# Patient Record
Sex: MALE | Race: WHITE | Employment: FULL TIME | URBAN - METROPOLITAN AREA
[De-identification: names, ages, dates, MRNs, and addresses within clinical notes are randomized per-mention and may not be internally consistent; named-entity substitution may affect disease eponyms.]

---

## 2018-07-11 ENCOUNTER — THERAPY VISIT (OUTPATIENT)
Dept: PHYSICAL THERAPY | Facility: CLINIC | Age: 48
End: 2018-07-11

## 2018-07-11 DIAGNOSIS — M54.5 LOW BACK PAIN, UNSPECIFIED BACK PAIN LATERALITY, UNSPECIFIED CHRONICITY, WITH SCIATICA PRESENCE UNSPECIFIED: Primary | ICD-10-CM

## 2018-07-11 PROCEDURE — 97530 THERAPEUTIC ACTIVITIES: CPT | Mod: GP | Performed by: PHYSICAL THERAPIST

## 2018-07-11 PROCEDURE — 97161 PT EVAL LOW COMPLEX 20 MIN: CPT | Mod: GP | Performed by: PHYSICAL THERAPIST

## 2018-07-11 PROCEDURE — 97110 THERAPEUTIC EXERCISES: CPT | Mod: GP | Performed by: PHYSICAL THERAPIST

## 2018-07-11 NOTE — MR AVS SNAPSHOT
After Visit Summary   7/11/2018    Leopoldo Scott    MRN: [id number]           Patient Information     Date Of Birth          1970        Visit Information        Provider Department      7/11/2018 3:00 PM Ledy Sands PT [facility]        Today's Diagnoses     Low back pain, unspecified back pain laterality, unspecified chronicity, with sciatica presence unspecified    -  1       Follow-ups after your visit        Who to contact     If you have questions or need follow up information about today's clinic visit or your schedule please contact [facility] directly at [phone].  Normal or non-critical lab and imaging results will be communicated to you by MyChart, letter or phone within 4 business days after the clinic has received the results. If you do not hear from us within 7 days, please contact the clinic through MyChart or phone. If you have a critical or abnormal lab result, we will notify you by phone as soon as possible.  Submit refill requests through [employer] or call your pharmacy and they will forward the refill request to us. Please allow 3 business days for your refill to be completed.          Additional Information About Your Visit        Care EveryWhere ID     This is your Care EveryWhere ID. This could be used by other organizations to access your [place] medical records  [id number]         Blood Pressure from Last 3 Encounters:   08/09/16 126/64   03/26/14 130/70   08/14/13 124/80    Weight from Last 3 Encounters:   08/09/16 92.5 kg (204 lb)   03/26/14 93.9 kg (207 lb)   08/14/13 91.4 kg (201 lb 6.4 oz)              We Performed the Following     HC PT EVAL, LOW COMPLEXITY     RHONA INITIAL EVAL REPORT     THERAPEUTIC ACTIVITIES     THERAPEUTIC EXERCISES        Primary Care Provider Office Phone # Fax #    Binu Starks [phone]  [facility] [address]         Equal Access to Services     [facility] : Hadii aad ku hadalejandrokumar Brindamathew, waflorda lujosselinemirandaha, qasilvio faustonicolakareen bruce. So [facility] [phone].    ATENCIÓN: Si habla español, tiene a melara disposición servicios gratuitos de asistencia lingüística. Llame al [phone].    We comply with applicable federal civil rights laws and [place] laws. We do not discriminate on the basis of race, color, national origin, age, disability, sex, sexual orientation, or gender identity.            Thank you!     Thank you for choosing [facility]   for your care. Our goal is always to provide you with excellent care. Hearing back from our patients is one way we can continue to improve our services. Please take a few minutes to complete the written survey that you may receive in the mail after your visit with us. Thank you!             Your Updated Medication List - Protect others around you: Learn how to safely use, store and throw away your medicines at www.disposemymeds.org.          This list is accurate as of 7/11/18 11:59 PM.  Always use your most recent med list.                   Brand Name Dispense Instructions for use Diagnosis    FISH OIL PO      Patient taking 2400-3600mg of this every day.        triamcinolone 0.1 % paste    KENALOG    5 g    Take by mouth 2 times daily    Oral lichen planus

## 2018-07-11 NOTE — PROGRESS NOTES
Subjective:    Referral source (MD, PT, DC) Self-referral  Dx LBP  DOI: 2017  Type of bike: hybrid [place]ale  How long have you been riding this bike: 3 months  Have you had a previous bikefit: no   Any recent changes to your bike added new stem to lift handle bars, different seats  Type of pedals: (clipless, flats)  Weekly miles bikin  Do you ride year round:(yes, no) no  How would you describe the type of rider you are (commuter, weekend warrior, recreational rider, racer)  Training for [employer]    Symptomology:   Do you currently have pain (yes, no)   How long have you had the pain years  Frequency of pain after 2-3 hrs of biking  Description of pain aching at middle low back  Where is the pain low back  Aggravating factors (other activities outside of riding that causes pain) and pain scale rating: prolonged standing, 2-310  How do you provoke the pain with riding(hills, mileage, intensity, gearing,)? mileage   What value is it on the pain scale?  2-310  How would you rate the intensity of your riding(RPE) with the pain?  12  RPE   (6 No exertion at all  7    7.5 Extremely light (7.5)  8    9  Very light  10    11 Light  12    13 Somewhat hard  14    15 Hard (heavy)  16    17 Very hard  18    19 Extremely hard  20 Maximal exertion)    Sx management strategies and lowest pain rating: sitting up taller and extending low back: 0/10      Objective:    Off the Bike measures, as indicated    Flexibility Screen:    Right Left             Hamstring WNL WNL   Hip Flexor     ITB/Lat Hip in SL     Quadriceps WNL Slight decrease   Gastroc/ Soleus WNL WNL   - = normal  + = mild tightness  ++ = moderate tightness  +++ = significant tightness       Static bike measures measurements:    Seat Level: measure off  if possible Initial: neutral Post: no change               Knee left right   Knee flex angle (seat height) 25 degrees/mm seat moved seat forward and new knee angle 30 degrees 25 degrees/mm seat moved  seate forward and new knee angle 30     Seat Position  Seat Fore/aft posterior moved seat forward 1 cm       Trunk angle    Shoulder angle (humerus, T1-7 with axis at GH, goal around 90) 90   Shoulder width Slightly excessive width      left right   Elbow (flexion angle) Slight bend Slight bend   Wrist position: neutral neutral     No Cleats     Some toe pedalling - educated pt in trying to keep foot netural                   Cleat Wedges - no cleats Right  left            Dynamic Assessment:    Anterior view:  Knee position/pedal stroke: left right    Top neutral;  Bottom neutral Top neutral bottom: neutral     Lateral view:  Trunk Position Good position   Reach Good position   Knee position Good position     Posterior view:  Pelvic Rotation neutral   UE Reach neutral       Clinical assessment and changes:    Increased torso angle from 50 degrees to 60 degrees by moving his seat 1 cm aft to decrease strain on low back and increase power of pedal stroke.  Educated pt on trying to decrease PF position with pedaling to decrease strain on calf muscles.            [facility] Initial Evaluation  Subjective:  HPI  Oswestry Score: 6.67 %                 Objective:  System    Physical Exam    General     ROS    Assessment/Plan:    Patient is a 47 year old male with lumbar complaints.    Patient has the following significant findings with corresponding treatment plan.                Diagnosis 1:  LBP  Pain -  directional preference exercise and home program  Decreased flexibility - therapeutic exercise and home program  Impaired muscle performance - neuro re-education and home program  Decreased function - therapeutic activities and home program    Therapy Evaluation Codes:   1) History comprised of:   Personal factors that impact the plan of care:      None.    Comorbidity factors that impact the plan of care are:      None.     Medications impacting care: None.  2) Examination of Body Systems comprised  of:   Body structures and functions that impact the plan of care:      Lumbar spine.   Activity limitations that impact the plan of care are:      Standing and biking.  3) Clinical presentation characteristics are:   Stable/Uncomplicated.  4) Decision-Making    Low complexity using standardized patient assessment instrument and/or measureable assessment of functional outcome.  Cumulative Therapy Evaluation is: Low complexity.    Previous and current functional limitations:  (See Goal Flow Sheet for this information)    Short term and Long term goals: (See Goal Flow Sheet for this information)     Communication ability:  Patient appears to be able to clearly communicate and understand verbal and written communication and follow directions correctly.  Treatment Explanation - The following has been discussed with the patient:   RX ordered/plan of care  Anticipated outcomes  Possible risks and side effects  This patient would benefit from PT intervention to resume normal activities.   Rehab potential is excellent.    Frequency:  1 X week, once daily  Duration:  for 1-2 weeks  Discharge Plan:  Achieve all LTG.  Independent in home treatment program.  Reach maximal therapeutic benefit.    Please refer to the daily flowsheet for treatment today, total treatment time and time spent performing 1:1 timed codes.

## 2018-07-11 NOTE — PROGRESS NOTES
[facility] Initial Evaluation  Subjective:  HPI Comments: Pt reports that he had a microdiscectomy in 2003.    Patient is a 47 year old male presenting with rehab back hpi. The history is provided by the patient. No  was used.   Leopoldo Scott is a 47 year old male with a lumbar condition.  Condition occurred with:  Other reason.  Condition occurred: other.  This is a chronic condition  5 years ago.    Patient reports pain:  Lumbar spine left and lumbar spine right.    Pain is described as aching and is intermittent      Symptoms are exacerbated by standing (1 hr tolerance standing, 2-3 hrs biking before back pain) and relieved by rest.  Since onset symptoms are gradually worsening (due to training for [employer]).        General health as reported by patient is good.    Medical allergies: yes.  Other surgeries include:  Orthopedic surgery (lumbar microdiscectomy).  Current medications:  Anti-inflammatory.  Current occupation is [employer].    Primary job tasks include:  Prolonged sitting.    Barriers include:  None as reported by the patient.    Red flags:  None as reported by the patient.                        Objective:  System         Lumbar/SI Evaluation  ROM:    AROM Lumbar:   Flexion:          WNL  Ext:                    WNL   Side Bend:        Left:     Right:   Rotation:           Left:     Right:   Side Glide:        Left:  WNL    Right:  WNL                                                              Hip Evaluation    Hip Strength:    Flexion:   Left: 5/5   Pain:  Right: 5/5   Pain:                    Extension:  Left: 4+/5  Pain:Right: 4+/5    Pain:    Abduction:  Left: 5/5     Pain:Right: 5/5    Pain:    Internal Rotation:  Left: 5/5    Pain:Right: 5/5   Pain:  External Rotation:  Left: 5/5   Pain:  Right: 5/5   Pain:  Knee Flexion:  Left: 5/5   Pain:Right: 5/5   Pain:  Knee Extension:  Left: 5/5   Pain:Right: 5/5    Pain:            Functional Testing:           Quad:    Single leg squat:    Left:    2  Significant loss of control, hip substitution, femoral IR, excessive anterior knee excursion and decreased hip/trunk flexion  Right:  2  Significant loss of control, hip substitution, femoral IR, decreased hip/trunk flexion and excessive anterior knee excursion    Bilateral leg squat:  2  Moderate loss of control, fermoral IR, excessive anterior knee excursion and decreased hip/trunk flexion                  General     ROS

## 2018-07-12 PROBLEM — M54.5 LOW BACK PAIN, UNSPECIFIED BACK PAIN LATERALITY, UNSPECIFIED CHRONICITY, WITH SCIATICA PRESENCE UNSPECIFIED: Status: ACTIVE | Noted: 2018-07-12

## 2018-10-17 PROBLEM — M54.5 LOW BACK PAIN, UNSPECIFIED BACK PAIN LATERALITY, UNSPECIFIED CHRONICITY, WITH SCIATICA PRESENCE UNSPECIFIED: Status: RESOLVED | Noted: 2018-07-12 | Resolved: 2018-10-17

## 2019-10-01 PROBLEM — M54.50 LOW BACK PAIN: Status: RESOLVED | Noted: 2018-07-12 | Resolved: 2018-10-17

## 2021-04-09 ENCOUNTER — DOCUMENTATION ONLY (OUTPATIENT)
Dept: LAB | Facility: CLINIC | Age: 51
End: 2021-04-09

## 2021-04-09 DIAGNOSIS — L43.8 ORAL LICHEN PLANUS: ICD-10-CM

## 2021-04-09 DIAGNOSIS — E78.2 MIXED HYPERLIPIDEMIA: Primary | ICD-10-CM

## 2021-04-09 DIAGNOSIS — R73.01 IFG (IMPAIRED FASTING GLUCOSE): ICD-10-CM

## 2021-04-09 DIAGNOSIS — Z12.5 SCREENING FOR PROSTATE CANCER: ICD-10-CM

## 2021-04-14 DIAGNOSIS — L43.8 ORAL LICHEN PLANUS: ICD-10-CM

## 2021-04-14 DIAGNOSIS — R73.01 IFG (IMPAIRED FASTING GLUCOSE): ICD-10-CM

## 2021-04-14 DIAGNOSIS — Z12.5 SCREENING FOR PROSTATE CANCER: ICD-10-CM

## 2021-04-14 DIAGNOSIS — E78.2 MIXED HYPERLIPIDEMIA: ICD-10-CM

## 2021-04-14 LAB
ALBUMIN SERPL-MCNC: 4.2 G/DL (ref 3.4–5)
ALP SERPL-CCNC: 69 U/L (ref 40–150)
ALT SERPL W P-5'-P-CCNC: 29 U/L (ref 0–70)
ANION GAP SERPL CALCULATED.3IONS-SCNC: 1 MMOL/L (ref 3–14)
AST SERPL W P-5'-P-CCNC: 18 U/L (ref 0–45)
BASOPHILS # BLD AUTO: 0 10E9/L (ref 0–0.2)
BASOPHILS NFR BLD AUTO: 0.4 %
BILIRUB SERPL-MCNC: 0.8 MG/DL (ref 0.2–1.3)
BUN SERPL-MCNC: 15 MG/DL (ref 7–30)
CALCIUM SERPL-MCNC: 9.3 MG/DL (ref 8.5–10.1)
CHLORIDE SERPL-SCNC: 107 MMOL/L (ref 94–109)
CHOLEST SERPL-MCNC: 220 MG/DL
CO2 SERPL-SCNC: 29 MMOL/L (ref 20–32)
CREAT SERPL-MCNC: 1 MG/DL (ref 0.66–1.25)
DIFFERENTIAL METHOD BLD: NORMAL
EOSINOPHIL # BLD AUTO: 0.3 10E9/L (ref 0–0.7)
EOSINOPHIL NFR BLD AUTO: 4.3 %
ERYTHROCYTE [DISTWIDTH] IN BLOOD BY AUTOMATED COUNT: 13.5 % (ref 10–15)
GFR SERPL CREATININE-BSD FRML MDRD: 88 ML/MIN/{1.73_M2}
GLUCOSE SERPL-MCNC: 95 MG/DL (ref 70–99)
HBA1C MFR BLD: 5.3 % (ref 0–5.6)
HCT VFR BLD AUTO: 49.5 % (ref 40–53)
HDLC SERPL-MCNC: 38 MG/DL
HGB BLD-MCNC: 17.1 G/DL (ref 13.3–17.7)
LDLC SERPL CALC-MCNC: ABNORMAL MG/DL
LYMPHOCYTES # BLD AUTO: 1.9 10E9/L (ref 0.8–5.3)
LYMPHOCYTES NFR BLD AUTO: 28.3 %
MCH RBC QN AUTO: 30.3 PG (ref 26.5–33)
MCHC RBC AUTO-ENTMCNC: 34.5 G/DL (ref 31.5–36.5)
MCV RBC AUTO: 88 FL (ref 78–100)
MONOCYTES # BLD AUTO: 0.5 10E9/L (ref 0–1.3)
MONOCYTES NFR BLD AUTO: 7.4 %
NEUTROPHILS # BLD AUTO: 4.1 10E9/L (ref 1.6–8.3)
NEUTROPHILS NFR BLD AUTO: 59.6 %
NONHDLC SERPL-MCNC: 182 MG/DL
PLATELET # BLD AUTO: 221 10E9/L (ref 150–450)
POTASSIUM SERPL-SCNC: 4.4 MMOL/L (ref 3.4–5.3)
PROT SERPL-MCNC: 7.2 G/DL (ref 6.8–8.8)
PSA SERPL-ACNC: 0.77 UG/L (ref 0–4)
RBC # BLD AUTO: 5.65 10E12/L (ref 4.4–5.9)
SODIUM SERPL-SCNC: 137 MMOL/L (ref 133–144)
TRIGL SERPL-MCNC: 456 MG/DL
WBC # BLD AUTO: 6.8 10E9/L (ref 4–11)

## 2021-04-14 PROCEDURE — 85025 COMPLETE CBC W/AUTO DIFF WBC: CPT | Performed by: INTERNAL MEDICINE

## 2021-04-14 PROCEDURE — 80061 LIPID PANEL: CPT | Performed by: INTERNAL MEDICINE

## 2021-04-14 PROCEDURE — 83036 HEMOGLOBIN GLYCOSYLATED A1C: CPT | Performed by: INTERNAL MEDICINE

## 2021-04-14 PROCEDURE — 80053 COMPREHEN METABOLIC PANEL: CPT | Performed by: INTERNAL MEDICINE

## 2021-04-14 PROCEDURE — G0103 PSA SCREENING: HCPCS | Performed by: INTERNAL MEDICINE

## 2021-04-14 PROCEDURE — 36415 COLL VENOUS BLD VENIPUNCTURE: CPT | Performed by: INTERNAL MEDICINE

## 2021-04-19 ENCOUNTER — OFFICE VISIT (OUTPATIENT)
Dept: INTERNAL MEDICINE | Facility: CLINIC | Age: 51
End: 2021-04-19

## 2021-04-19 VITALS
SYSTOLIC BLOOD PRESSURE: 122 MMHG | WEIGHT: 207 LBS | DIASTOLIC BLOOD PRESSURE: 80 MMHG | BODY MASS INDEX: 29.7 KG/M2 | TEMPERATURE: 98.1 F | OXYGEN SATURATION: 97 % | HEART RATE: 64 BPM

## 2021-04-19 DIAGNOSIS — Z12.11 SPECIAL SCREENING FOR MALIGNANT NEOPLASMS, COLON: ICD-10-CM

## 2021-04-19 DIAGNOSIS — Z00.00 ROUTINE HISTORY AND PHYSICAL EXAMINATION OF ADULT: Primary | ICD-10-CM

## 2021-04-19 DIAGNOSIS — E78.2 MIXED HYPERLIPIDEMIA: ICD-10-CM

## 2021-04-19 PROCEDURE — 99396 PREV VISIT EST AGE 40-64: CPT | Performed by: INTERNAL MEDICINE

## 2021-04-19 NOTE — PROGRESS NOTES
SUBJECTIVE:   CC: Leopoldo Scott is an 50 year old male who presents for preventative health visit.       Patient has been advised of split billing requirements and indicates understanding: Yes  Healthy Habits:    Getting at least 3 servings of Calcium per day:  Yes    Bi-annual eye exam:  NO    Dental care twice a year:  NO    Sleep apnea or symptoms of sleep apnea:  None    Diet:  Regular (no restrictions)    Frequency of exercise:  4-5 days/week    Duration of exercise:  Other    Taking medications regularly:  Yes    Barriers to taking medications:  Problems remembering to take them    Medication side effects:  None    PHQ-2 Total Score:    Additional concerns today:  No        Walking, yard work, biking.                  Not taking much fish oil.         He has mixed hyperlipidemia, with high triglycerides and low HDL.         He has not wanted prescription medications to treat this abnormality.        He has not been taking OTC fish oil with any regularity.       Wants to try that approach again.                                 He has 6 healthy children.             He is employed.                             He does not want Covid vaccine.        He states he had a Td vaccine less than 10 years ago.            He is willing to have a colonoscopy.      Today's PHQ-2 Score:   PHQ-2 ( 1999 Pfizer) 8/9/2016   Q1: Little interest or pleasure in doing things 0   Q2: Feeling down, depressed or hopeless 0   PHQ-2 Score 0       Abuse: Current or Past(Physical, Sexual or Emotional)- No  Do you feel safe in your environment? Yes    Have you ever done Advance Care Planning? (For example, a Health Directive, POLST, or a discussion with a medical provider or your loved ones about your wishes): Yes, patient states has an Advance Care Planning document and will bring a copy to the clinic.    Social History     Tobacco Use     Smoking status: Never Smoker     Smokeless tobacco: Never Used   Substance Use Topics     Alcohol  use: No     If you drink alcohol do you typically have >3 drinks per day or >7 drinks per week? No    Alcohol Use 8/9/2016   Prescreen: >3 drinks/day or >7 drinks/week? The patient does not drink >3 drinks per day nor >7 drinks per week.       Last PSA:   PSA   Date Value Ref Range Status   04/14/2021 0.77 0 - 4 ug/L Final     Comment:     Assay Method:  Chemiluminescence using Siemens Vista analyzer       Reviewed orders with patient. Reviewed health maintenance and updated orders accordingly - Yes  Patient Active Problem List    Diagnosis Date Noted     Mixed hyperlipidemia 08/14/2013     Priority: High     High TG ( > 700 without Rx ) and low HDL( < 40).              Did not tolerate pravastatin or > 500 mg niacin or > 1 gm fish oil; he did not accept a gemfibrozil rx in 2007.           In 3/14, taking 500 mg niacin, 1200 mg red yeast rice, 1400 mg fish oil;   202/39/106/287; increase  Red yeast rice to 1800 mg       Plantar fasciitis 08/09/2016     Priority: Medium     Oral lichen planus 08/09/2016     Priority: Medium     IFG (impaired fasting glucose) 10/28/2014     Priority: Medium     100 in 10/14       Preventive measure 08/12/2013     Priority: Low     APRIMA DATA BASE UNDER THE 8/12/13 NOTE         Past Surgical History:   Procedure Laterality Date     SPINE SURGERY  2003    lumbar discectomy     Social History     Socioeconomic History     Marital status:      Spouse name: Not on file     Number of children: 6     Years of education: Not on file     Highest education level: Not on file   Occupational History     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[place]     Employer: [employer]     Comment: [employer]   Social Needs     Financial resource strain: Not on file     Food insecurity     Worry: Not on file     Inability: Not on file     Transportation needs     Medical: Not on file     Non-medical: Not on file   Tobacco Use     Smoking status: Never Smoker     Smokeless tobacco: Never Used   Substance and  Sexual Activity     Alcohol use: No     Drug use: No     Sexual activity: Yes     Partners: Female   Lifestyle     Physical activity     Days per week: Not on file     Minutes per session: Not on file     Stress: Not on file   Relationships     Social connections     Talks on phone: Not on file     Gets together: Not on file     Attends [employer]ars[employer] service: Not on file     Active member of club or organization: Not on file     Attends meetings of clubs or organizations: Not on file     Relationship status: Not on file     Intimate partner violence     Fear of current or ex partner: Not on file     Emotionally abused: Not on file     Physically abused: Not on file     Forced sexual activity: Not on file   Other Topics Concern     Parent/sibling w/ CABG, MI or angioplasty before 65F 55M? No   Social History Narrative     Not on file     Immunization History   Administered Date(s) Administered     Tdap (Adacel,Boostrix) 09/28/2007     Family History   Problem Relation Age of Onset     Diabetes No family hx of      Cancer No family hx of        BP Readings from Last 3 Encounters:   04/19/21 122/80   08/09/16 126/64   03/26/14 130/70    Wt Readings from Last 3 Encounters:   04/19/21 93.9 kg (207 lb)   08/09/16 92.5 kg (204 lb)   03/26/14 93.9 kg (207 lb)                  Current Outpatient Medications   Medication Sig Dispense Refill     Omega-3 Fatty Acids (FISH OIL PO) Patient taking 2400-3600mg of this every day.       triamcinolone (KENALOG) 0.1 % paste Take by mouth 2 times daily 5 g 11     Allergies   Allergen Reactions     Doxycycline      Pravastatin        Reviewed and updated as needed this visit by clinical staff                 Reviewed and updated as needed this visit by Provider                    Review of Systems      OBJECTIVE:   /80   Pulse 64   Temp 98.1  F (36.7  C) (Oral)   Wt 93.9 kg (207 lb)   SpO2 97%   BMI 29.70 kg/m      Physical Exam  GENERAL APPEARANCE: alert, no distress and over  weight  NECK: no adenopathy, no asymmetry, masses, or scars and thyroid normal to palpation  RESP: lungs clear to auscultation - no rales, rhonchi or wheezes  CV: regular rates and rhythm, normal S1 S2, no S3 or S4 and no murmur, click or rub    Diagnostic Test Results:  Recent Results (from the past 400 hour(s))   Hemoglobin A1c    Collection Time: 04/14/21  8:45 AM   Result Value Ref Range    Hemoglobin A1C 5.3 0 - 5.6 %   Comprehensive metabolic panel (BMP + Alb, Alk Phos, ALT, AST, Total. Bili, TP)    Collection Time: 04/14/21  8:45 AM   Result Value Ref Range    Sodium 137 133 - 144 mmol/L    Potassium 4.4 3.4 - 5.3 mmol/L    Chloride 107 94 - 109 mmol/L    Carbon Dioxide 29 20 - 32 mmol/L    Anion Gap 1 (L) 3 - 14 mmol/L    Glucose 95 70 - 99 mg/dL    Urea Nitrogen 15 7 - 30 mg/dL    Creatinine 1.00 0.66 - 1.25 mg/dL    GFR Estimate 88 >60 mL/min/[1.73_m2]    GFR Estimate If Black >90 >60 mL/min/[1.73_m2]    Calcium 9.3 8.5 - 10.1 mg/dL    Bilirubin Total 0.8 0.2 - 1.3 mg/dL    Albumin 4.2 3.4 - 5.0 g/dL    Protein Total 7.2 6.8 - 8.8 g/dL    Alkaline Phosphatase 69 40 - 150 U/L    ALT 29 0 - 70 U/L    AST 18 0 - 45 U/L   Lipid Profile (Chol, Trig, HDL, LDL calc)    Collection Time: 04/14/21  8:45 AM   Result Value Ref Range    Cholesterol 220 (H) <200 mg/dL    Triglycerides 456 (H) <150 mg/dL    HDL Cholesterol 38 (L) >39 mg/dL    LDL Cholesterol Calculated  <100 mg/dL     Cannot estimate LDL when triglyceride exceeds 400 mg/dL    Non HDL Cholesterol 182 (H) <130 mg/dL   CBC with platelets and differential    Collection Time: 04/14/21  8:45 AM   Result Value Ref Range    WBC 6.8 4.0 - 11.0 10e9/L    RBC Count 5.65 4.4 - 5.9 10e12/L    Hemoglobin 17.1 13.3 - 17.7 g/dL    Hematocrit 49.5 40.0 - 53.0 %    MCV 88 78 - 100 fl    MCH 30.3 26.5 - 33.0 pg    MCHC 34.5 31.5 - 36.5 g/dL    RDW 13.5 10.0 - 15.0 %    Platelet Count 221 150 - 450 10e9/L    % Neutrophils 59.6 %    % Lymphocytes 28.3 %    % Monocytes 7.4 %     % Eosinophils 4.3 %    % Basophils 0.4 %    Absolute Neutrophil 4.1 1.6 - 8.3 10e9/L    Absolute Lymphocytes 1.9 0.8 - 5.3 10e9/L    Absolute Monocytes 0.5 0.0 - 1.3 10e9/L    Absolute Eosinophils 0.3 0.0 - 0.7 10e9/L    Absolute Basophils 0.0 0.0 - 0.2 10e9/L    Diff Method Automated Method    PSA, screen    Collection Time: 04/14/21  8:45 AM   Result Value Ref Range    PSA 0.77 0 - 4 ug/L         ASSESSMENT/PLAN:   Leopoldo was seen today for physical.    Diagnoses and all orders for this visit:    Routine history and physical examination of adult    Mixed hyperlipidemia  -     Lipid Profile (Chol, Trig, HDL, LDL calc); Future    Special screening for malignant neoplasms, colon  -     GASTROENTEROLOGY ADULT REF PROCEDURE ONLY; Future     Summary and implications:  We reviewed multiple issues.           We reviewed all of the issues on the diagnoses list.           Patient Instructions                Go ahead and resume taking the omega-3 fish oil; 1200 mg with each meal.       Let's plan on rechecking the lipids in 6 months.         If you do not hear from the GI  regarding a colonoscopy, call [phone].              Please note that I am planning to retire on December 31, 2021.              You are welcome to continue your care through this clinic.              For a variety of reasons I will not refer you to a specific provider.                  Another option of course is to switch to a clinic which is closer to your residence.         Return in about 6 months (around 10/19/2021) for follow up of several issues, fasting labs 1 wk before your next appointment.      Patient has been advised of split billing requirements and indicates understanding: Yes  COUNSELING:   Reviewed preventive health counseling, as reflected in patient instructions  Special attention given to:        Regular exercise       Healthy diet/nutrition    Estimated body mass index is 29.27 kg/m  as calculated from the following:     "Height as of 8/9/16: 1.778 m (5' 10\").    Weight as of 8/9/16: 92.5 kg (204 lb).     Weight management plan: Discussed healthy diet and exercise guidelines    He reports that he has never smoked. He has never used smokeless tobacco.      Counseling Resources:  ATP IV Guidelines  Pooled Cohorts Equation Calculator  FRAX Risk Assessment  ICSI Preventive Guidelines  Dietary Guidelines for Americans, 2010  USDA's MyPlate  ASA Prophylaxis  Lung CA Screening    Binu Starks MD  [facility]  "

## 2021-04-19 NOTE — PATIENT INSTRUCTIONS
Go ahead and resume taking the omega-3 fish oil; 1200 mg with each meal.       Let's plan on rechecking the lipids in 6 months.         If you do not hear from the GI  regarding a colonoscopy, call [phone].              Please note that I am planning to retire on December 31, 2021.              You are welcome to continue your care through this clinic.              For a variety of reasons I will not refer you to a specific provider.                  Another option of course is to switch to a clinic which is closer to your residence.

## 2023-11-17 ENCOUNTER — LAB REQUISITION (OUTPATIENT)
Dept: LAB | Facility: CLINIC | Age: 53
End: 2023-11-17

## 2023-11-17 DIAGNOSIS — Z12.5 ENCOUNTER FOR SCREENING FOR MALIGNANT NEOPLASM OF PROSTATE: ICD-10-CM

## 2023-11-17 LAB — PSA SERPL DL<=0.01 NG/ML-MCNC: 1.26 NG/ML (ref 0–3.5)

## 2023-11-17 PROCEDURE — 84153 ASSAY OF PSA TOTAL: CPT | Performed by: STUDENT IN AN ORGANIZED HEALTH CARE EDUCATION/TRAINING PROGRAM

## 2024-02-19 ENCOUNTER — LAB REQUISITION (OUTPATIENT)
Dept: LAB | Facility: CLINIC | Age: 54
End: 2024-02-19

## 2024-02-19 DIAGNOSIS — R73.03 PREDIABETES: ICD-10-CM

## 2024-02-19 DIAGNOSIS — E78.1 PURE HYPERGLYCERIDEMIA: ICD-10-CM

## 2024-02-19 LAB
ALBUMIN SERPL BCG-MCNC: 4.8 G/DL (ref 3.5–5.2)
ALP SERPL-CCNC: 65 U/L (ref 40–150)
ALT SERPL W P-5'-P-CCNC: 23 U/L (ref 0–70)
ANION GAP SERPL CALCULATED.3IONS-SCNC: 9 MMOL/L (ref 7–15)
AST SERPL W P-5'-P-CCNC: 19 U/L (ref 0–45)
BILIRUB SERPL-MCNC: 0.9 MG/DL
BUN SERPL-MCNC: 15.6 MG/DL (ref 6–20)
CALCIUM SERPL-MCNC: 9.9 MG/DL (ref 8.6–10)
CHLORIDE SERPL-SCNC: 101 MMOL/L (ref 98–107)
CREAT SERPL-MCNC: 0.97 MG/DL (ref 0.67–1.17)
DEPRECATED HCO3 PLAS-SCNC: 25 MMOL/L (ref 22–29)
EGFRCR SERPLBLD CKD-EPI 2021: >90 ML/MIN/1.73M2
GLUCOSE SERPL-MCNC: 99 MG/DL (ref 70–99)
HBA1C MFR BLD: 5.3 %
POTASSIUM SERPL-SCNC: 4.2 MMOL/L (ref 3.4–5.3)
PROT SERPL-MCNC: 7.2 G/DL (ref 6.4–8.3)
SODIUM SERPL-SCNC: 135 MMOL/L (ref 135–145)

## 2024-02-19 PROCEDURE — 82040 ASSAY OF SERUM ALBUMIN: CPT | Performed by: STUDENT IN AN ORGANIZED HEALTH CARE EDUCATION/TRAINING PROGRAM

## 2024-02-19 PROCEDURE — 82465 ASSAY BLD/SERUM CHOLESTEROL: CPT | Performed by: STUDENT IN AN ORGANIZED HEALTH CARE EDUCATION/TRAINING PROGRAM

## 2024-02-19 PROCEDURE — 83036 HEMOGLOBIN GLYCOSYLATED A1C: CPT | Performed by: STUDENT IN AN ORGANIZED HEALTH CARE EDUCATION/TRAINING PROGRAM

## 2024-02-20 LAB
CHOLEST SERPL-MCNC: 194 MG/DL
FASTING STATUS PATIENT QL REPORTED: YES
HDLC SERPL-MCNC: 39 MG/DL
LDLC SERPL CALC-MCNC: 104 MG/DL
NONHDLC SERPL-MCNC: 155 MG/DL
TRIGL SERPL-MCNC: 255 MG/DL